# Patient Record
Sex: FEMALE
[De-identification: names, ages, dates, MRNs, and addresses within clinical notes are randomized per-mention and may not be internally consistent; named-entity substitution may affect disease eponyms.]

---

## 2019-08-08 ENCOUNTER — APPOINTMENT (OUTPATIENT)
Dept: PHYSICAL MEDICINE AND REHAB | Facility: CLINIC | Age: 34
End: 2019-08-08
Payer: COMMERCIAL

## 2019-08-08 VITALS — HEIGHT: 67 IN | WEIGHT: 141 LBS | BODY MASS INDEX: 22.13 KG/M2

## 2019-08-08 DIAGNOSIS — Z82.61 FAMILY HISTORY OF ARTHRITIS: ICD-10-CM

## 2019-08-08 DIAGNOSIS — M25.862 OTHER SPECIFIED JOINT DISORDERS, LEFT KNEE: ICD-10-CM

## 2019-08-08 DIAGNOSIS — M25.861 OTHER SPECIFIED JOINT DISORDERS, RIGHT KNEE: ICD-10-CM

## 2019-08-08 DIAGNOSIS — Z78.9 OTHER SPECIFIED HEALTH STATUS: ICD-10-CM

## 2019-08-08 DIAGNOSIS — Z80.9 FAMILY HISTORY OF MALIGNANT NEOPLASM, UNSPECIFIED: ICD-10-CM

## 2019-08-08 PROBLEM — Z00.00 ENCOUNTER FOR PREVENTIVE HEALTH EXAMINATION: Status: ACTIVE | Noted: 2019-08-08

## 2019-08-08 PROCEDURE — 99204 OFFICE O/P NEW MOD 45 MIN: CPT

## 2019-08-08 PROCEDURE — 73562 X-RAY EXAM OF KNEE 3: CPT | Mod: 50

## 2019-08-08 RX ORDER — MELOXICAM 7.5 MG/1
7.5 TABLET ORAL TWICE DAILY
Qty: 60 | Refills: 0 | Status: ACTIVE | COMMUNITY
Start: 2019-08-08 | End: 1900-01-01

## 2019-08-08 NOTE — HISTORY OF PRESENT ILLNESS
[FreeTextEntry1] : Location: knees\par Quality: sharp\par Severity: 8/10\par Duration: few months\par Timing: chronic\par Context: atraumatic\par Aggravating Factors: squats, landing a jump\par Alleviating Factors: rest\par Associated Symptoms: denies weight loss, fever, chills, change in bowel/bladder habits, redness, warmth, weakness, numbness/tingling, +clicking\par Prior Studies:\par

## 2019-08-08 NOTE — ASSESSMENT
[FreeTextEntry1] : Needs to stretch hamstrings and learn better squat form.  Taught wall squat.  Ice area often.

## 2019-08-08 NOTE — PHYSICAL EXAM
[FreeTextEntry1] : 33 year yo female in NAD and normal body habitus\par \par \par Gait - normal\par no swelling, erythema, warmth\par FROM without pain\par no TTP in patellar and quad tendon, medial/lateral joint\par 5/5 knee ext\par neg Lachman, Arnav, valgus/varus laxity\par poor squat form - knees too far forward

## 2020-02-26 ENCOUNTER — APPOINTMENT (OUTPATIENT)
Dept: ORTHOPEDIC SURGERY | Facility: CLINIC | Age: 35
End: 2020-02-26
Payer: COMMERCIAL

## 2020-02-26 VITALS — HEIGHT: 67 IN | BODY MASS INDEX: 22.13 KG/M2 | WEIGHT: 141 LBS

## 2020-02-26 DIAGNOSIS — M75.42 IMPINGEMENT SYNDROME OF LEFT SHOULDER: ICD-10-CM

## 2020-02-26 DIAGNOSIS — M75.82 OTHER SHOULDER LESIONS, LEFT SHOULDER: ICD-10-CM

## 2020-02-26 DIAGNOSIS — M25.512 PAIN IN LEFT SHOULDER: ICD-10-CM

## 2020-02-26 PROCEDURE — 99204 OFFICE O/P NEW MOD 45 MIN: CPT

## 2020-02-26 PROCEDURE — 73030 X-RAY EXAM OF SHOULDER: CPT | Mod: LT

## 2020-02-26 RX ORDER — DICLOFENAC SODIUM 75 MG/1
75 TABLET, DELAYED RELEASE ORAL
Qty: 60 | Refills: 2 | Status: ACTIVE | COMMUNITY
Start: 2020-02-26 | End: 1900-01-01

## 2020-02-26 NOTE — PHYSICAL EXAM
[de-identified] : PHYSICAL EXAM LEFT  SHOULDER\par \par MILD SCAPULAR PROTRACTION\par AROM 150 / 150 / 100 / 40\par TENDER: SA REGION \par \par SPECIAL TESTING :\par RUDD - POSITIVE \par JESSICA - POSITIVE \par SPEED TEST - POSITIVE\par \par LANDON - NEGATIVE \par APPREHENSION AND SUPPRESSION - NEGATIVE \par \par RC STRENGTH TESTING \par SS:  5/5\par SUB 5/5\par IS     5/5\par BICEPS  5/5\par \par SENSATION  - GROSSLY INTACT\par \par \par  [de-identified] : LEFT SHOULDER XRAY -  \par NO OBVIOUS FRACTURE , SEPARATION OR DISLOCATION \par NO SIGNIFICANT OSTEOARTHRITIS, \par TYPE 2B ACROMION \par CSA= 40.5\par

## 2020-02-26 NOTE — HISTORY OF PRESENT ILLNESS
[de-identified] : LEFT SHOULDER\par 1 MONTH\par POSSIBLE FROM WORKING OUT  OR CARRYING HEAVY BAGS\par PAIN LEVEL 9/10\par INTERMITTENT PAIN\par SHARP\par WORSE WITH LIFTING, REACHING ACROSS, LYING DOWN\par CLICKING\par WEAKNESS\par RADIATING INTO BICEP AREA\par